# Patient Record
Sex: FEMALE | Race: BLACK OR AFRICAN AMERICAN | NOT HISPANIC OR LATINO | Employment: FULL TIME | URBAN - METROPOLITAN AREA
[De-identification: names, ages, dates, MRNs, and addresses within clinical notes are randomized per-mention and may not be internally consistent; named-entity substitution may affect disease eponyms.]

---

## 2019-03-09 ENCOUNTER — HOSPITAL ENCOUNTER (EMERGENCY)
Facility: HOSPITAL | Age: 24
Discharge: HOME | End: 2019-03-09
Attending: EMERGENCY MEDICINE | Admitting: EMERGENCY MEDICINE
Payer: COMMERCIAL

## 2019-03-09 VITALS
OXYGEN SATURATION: 97 % | SYSTOLIC BLOOD PRESSURE: 121 MMHG | BODY MASS INDEX: 17.68 KG/M2 | RESPIRATION RATE: 18 BRPM | TEMPERATURE: 102.9 F | HEART RATE: 122 BPM | HEIGHT: 66 IN | WEIGHT: 110 LBS | DIASTOLIC BLOOD PRESSURE: 72 MMHG

## 2019-03-09 DIAGNOSIS — J02.9 ACUTE PHARYNGITIS: Primary | ICD-10-CM

## 2019-03-09 PROCEDURE — 99283 EMERGENCY DEPT VISIT LOW MDM: CPT

## 2019-03-09 RX ORDER — AMOXICILLIN 500 MG/1
500 CAPSULE ORAL 3 TIMES DAILY
Qty: 30 CAPSULE | Refills: 0 | Status: SHIPPED | OUTPATIENT
Start: 2019-03-09 | End: 2019-03-19

## 2019-03-09 RX ORDER — AMOXICILLIN 500 MG/1
500 CAPSULE ORAL 3 TIMES DAILY
Qty: 30 CAPSULE | Refills: 0 | Status: SHIPPED | OUTPATIENT
Start: 2019-03-09 | End: 2019-03-09 | Stop reason: SDUPTHER

## 2019-03-09 RX ORDER — ACETAMINOPHEN 160 MG/5ML
650 SUSPENSION, ORAL (FINAL DOSE FORM) ORAL ONCE
Status: COMPLETED | OUTPATIENT
Start: 2019-03-09 | End: 2019-03-09

## 2019-03-09 RX ADMIN — DEXAMETHASONE SODIUM PHOSPHATE 10 MG: 10 INJECTION, SOLUTION INTRAMUSCULAR; INTRAVENOUS at 09:05

## 2019-03-09 RX ADMIN — ACETAMINOPHEN 650 MG: 160 SUSPENSION ORAL at 09:04

## 2019-03-09 NOTE — ED PROVIDER NOTES
History  Chief Complaint   Patient presents with    Sore Throat     Patient c/o sore throat since yesterday along with white spots and swollen tonsils  24 y o  female presents to ED with chief complaint of sore throat  Onset of symptoms reported as 1 day ago  Location of symptoms is reported as posterior throat  Quality is reported as aching pain  Severity is reported as moderate  Associated symptoms:    Positive for fevers  denies runny nose/congestion  Denies dysphagia  Denies dysphonia  Denies cough  Denies SOB  Denies Rash  Denies neck pain  Denies lip/tongue/facial swelling  Denies Wheezing  Modifiers:  Swallowing exacerbates pain  Context:    Denies recent travel outside the country  denies for recent sick contacts  Denies history of frequent throat infections, reports she started with sore throat and noticed white spots on her tonsils yesterday  No drooling or pooling of secretions  Medical summary:  Review of past visit history via EPIC demonstrates no prior visits to this ed  History provided by:  Patient   used: No        None       History reviewed  No pertinent past medical history  History reviewed  No pertinent surgical history  History reviewed  No pertinent family history  I have reviewed and agree with the history as documented  Social History     Tobacco Use    Smoking status: Never Smoker    Smokeless tobacco: Never Used   Substance Use Topics    Alcohol use: Never     Frequency: Never    Drug use: Never        Review of Systems   Constitutional: Positive for fever  Negative for activity change, appetite change, chills, diaphoresis and fatigue  HENT: Positive for sore throat  Negative for congestion, dental problem, drooling, ear discharge, ear pain, facial swelling, hearing loss, mouth sores, nosebleeds, postnasal drip, rhinorrhea, sinus pressure, sinus pain, sneezing, tinnitus and voice change      Eyes: Negative for photophobia, pain, discharge, redness and itching  Respiratory: Negative for cough, chest tightness, shortness of breath and wheezing  Cardiovascular: Negative for chest pain, palpitations and leg swelling  Gastrointestinal: Negative for abdominal pain, anal bleeding, blood in stool, constipation, diarrhea, nausea and vomiting  Endocrine: Negative for cold intolerance, heat intolerance, polydipsia, polyphagia and polyuria  Genitourinary: Negative for decreased urine volume, difficulty urinating, dysuria, flank pain, frequency, hematuria and urgency  Musculoskeletal: Negative for arthralgias, back pain, gait problem, joint swelling, myalgias, neck pain and neck stiffness  Skin: Negative for color change, pallor, rash and wound  Allergic/Immunologic: Negative for environmental allergies, food allergies and immunocompromised state  Neurological: Negative for dizziness, tremors, seizures, syncope, facial asymmetry, speech difficulty, weakness, light-headedness, numbness and headaches  Hematological: Negative for adenopathy  Does not bruise/bleed easily  Psychiatric/Behavioral: Negative for agitation, confusion, decreased concentration and hallucinations  The patient is not nervous/anxious  All other systems reviewed and are negative  Physical Exam  Physical Exam   Constitutional: She is oriented to person, place, and time  She appears well-developed and well-nourished  No distress  /67 (BP Location: Right arm)   Pulse (!) 142   Temp (!) 103 °F (39 4 °C) (Oral)   Resp 20   Ht 5' 5 5" (1 664 m)   Wt 49 9 kg (110 lb)   LMP 02/23/2019   SpO2 97%   BMI 18 03 kg/m²     Patient febrile to 103  Pulse tachycardic at 1:42 a m  Suspect this is secondary to fever  HENT:   Head: Normocephalic and atraumatic  Right Ear: External ear normal    Left Ear: External ear normal    Nose: Nose normal    Mouth/Throat: Oropharyngeal exudate present  Posterior pharynx injected and erythematous    Uvula midline without deviation  Tonsils erythematous and edematous with purulent exudate present  No drooling  No trismus  Mucous membranes moist and pink with no clinical signs of dehydration  No lip/tongue/facial swelling  No submandibular or sublingual fullness or swelling  Eyes: Pupils are equal, round, and reactive to light  Conjunctivae and EOM are normal  Right eye exhibits no discharge  Left eye exhibits no discharge  No scleral icterus  Neck: Normal range of motion  Neck supple  No JVD present  No tracheal deviation present  Cardiovascular: Normal rate, regular rhythm and intact distal pulses  Pulmonary/Chest: Effort normal and breath sounds normal  No stridor  No respiratory distress  She has no wheezes  She has no rales  She exhibits no tenderness  Abdominal: Soft  Bowel sounds are normal  She exhibits no distension and no mass  There is no tenderness  There is no rebound and no guarding  No hernia  Musculoskeletal: Normal range of motion  She exhibits no edema, tenderness or deformity  Lymphadenopathy:     She has cervical adenopathy  Neurological: She is alert and oriented to person, place, and time  She displays normal reflexes  No cranial nerve deficit or sensory deficit  She exhibits normal muscle tone  Coordination normal    Skin: Skin is warm and dry  Capillary refill takes less than 2 seconds  No rash noted  She is not diaphoretic  No erythema  No pallor  Psychiatric: She has a normal mood and affect  Her behavior is normal  Judgment and thought content normal    Nursing note and vitals reviewed        Vital Signs  ED Triage Vitals [03/09/19 0822]   Temperature Pulse Respirations Blood Pressure SpO2   (!) 103 °F (39 4 °C) (!) 142 20 131/67 97 %      Temp Source Heart Rate Source Patient Position - Orthostatic VS BP Location FiO2 (%)   Oral Monitor Lying Right arm --      Pain Score       --           Vitals:    03/09/19 0822   BP: 131/67   Pulse: (!) 142   Patient Position - Orthostatic VS: Lying       Visual Acuity      ED Medications  Medications   acetaminophen (TYLENOL) oral suspension 650 mg (has no administration in time range)   dexamethasone 10 mg/mL oral liquid 10 mg 1 mL (has no administration in time range)       Diagnostic Studies  Results Reviewed     None                 No orders to display              Procedures  Procedures       Phone Contacts  ED Phone Contact    ED Course                               MDM  Number of Diagnoses or Management Options  Acute pharyngitis: new and does not require workup  Diagnosis management comments: ddx includes but is not limited to bacterial vs viral pharyngitis, tonsillitis, uvulitis, PTA, viral syndrome, post nasal drip  Seasonal allergies, laryngitis, mono, consider but doubt retropharyngeal abscess, epiglottitis, foreign body ingestion  MDM Centor criteria - 4/4 present- will treat for presumptive group A strep  Patient does not exhibit any unusual or severe clinical findings such as secretions, drooling, dysphonia, muffled "hot potato" voice, difficulty breathing, stridor or neck swelling that would suggest more severe parapharyngeal space infections  Discussed with patient symptoms appear most consistent with strep pharyngitis  Will treat with amoxicillin  Add dexamethasone here for pain  Add use of viscous lidocaine at home for treatment of pain  Discussed encouragement of fluids  Discussed follow up with primary care physician and ENT in 1-2 days for recheck of symptoms  Patient is currently visiting here and will follow up at home  Extensive review with patient regarding reasons to return to ed  Reviewed reasons to return to ed  Patient verbalized understanding of diagnosis and agreement with discharge plan of care as well as understanding of reasons to return to ed            Amount and/or Complexity of Data Reviewed  Review and summarize past medical records: yes    Patient Progress  Patient progress: stable      Disposition  Final diagnoses:   Acute pharyngitis     Time reflects when diagnosis was documented in both MDM as applicable and the Disposition within this note     Time User Action Codes Description Comment    3/9/2019  8:49 AM Viridiana Borrero [J02 [id number]] Acute pharyngitis       ED Disposition     ED Disposition Condition Date/Time Comment    Discharge Stable Sat Mar 9, 2019  8:49 AM Carissa Conner discharge to home/self care  Follow-up Information     Follow up With Specialties Details Why Contact Info Additional [address] [facility] Emergency Department Emergency Medicine Go to  If symptoms worsen [address] [facility] [address], [place], [place], [address]    Reny Lechuga MD Otolaryngology Call in 2 days for further evaluation of symptoms [address] [facility]  [address] [facility] [address]       Primary care physician at home  Call in 1 day for further evaluation of symptoms            Patient's Medications   Discharge Prescriptions    AMOXICILLIN (AMOXIL) 500 MG CAPSULE    Take 1 capsule (500 mg total) by mouth 3 (three) times a day for 10 days       Start Date: 3/9/2019  End Date: 3/19/2019       Order Dose: 500 mg       Quantity: 30 capsule    Refills: 0    LIDOCAINE VISCOUS (XYLOCAINE) 2 % MUCOSAL SOLUTION    Swish and spit 15 mL 4 (four) times a day as needed for mild pain (pharyngitis/initial rx )       Start Date: 3/9/2019  End Date: --       Order Dose: 15 mL       Quantity: 100 mL    Refills: 0     No discharge procedures on file      ED Provider  Electronically Signed by           Meli Jenkins PA-C  03/09/19 2729